# Patient Record
Sex: FEMALE | Race: BLACK OR AFRICAN AMERICAN | NOT HISPANIC OR LATINO | Employment: FULL TIME | URBAN - METROPOLITAN AREA
[De-identification: names, ages, dates, MRNs, and addresses within clinical notes are randomized per-mention and may not be internally consistent; named-entity substitution may affect disease eponyms.]

---

## 2019-01-03 LAB
HUMAN PAPILLOMAVIRUS (HPV): NORMAL
HUMAN PAPILLOMAVIRUS (HPV): NORMAL
PAP RECOMMENDATION EXT: NORMAL

## 2019-03-04 ENCOUNTER — ROUTINE PRENATAL (OUTPATIENT)
Dept: OBSTETRICS AND GYNECOLOGY | Facility: CLINIC | Age: 34
End: 2019-03-04
Payer: MEDICAID

## 2019-03-04 VITALS — HEART RATE: 102 BPM | SYSTOLIC BLOOD PRESSURE: 95 MMHG | WEIGHT: 277 LBS | DIASTOLIC BLOOD PRESSURE: 64 MMHG

## 2019-03-04 DIAGNOSIS — O09.522 ELDERLY MULTIGRAVIDA IN SECOND TRIMESTER: ICD-10-CM

## 2019-03-04 DIAGNOSIS — Z34.82 ENCOUNTER FOR SUPERVISION OF NORMAL PREGNANCY IN MULTIGRAVIDA IN SECOND TRIMESTER: Primary | ICD-10-CM

## 2019-03-04 PROCEDURE — 99213 PR OFFICE/OUTPT VISIT, EST, LEVL III, 20-29 MIN: ICD-10-PCS | Mod: TH,S$GLB,, | Performed by: OBSTETRICS & GYNECOLOGY

## 2019-03-04 PROCEDURE — 99213 OFFICE O/P EST LOW 20 MIN: CPT | Mod: TH,S$GLB,, | Performed by: OBSTETRICS & GYNECOLOGY

## 2019-03-04 NOTE — PROGRESS NOTES
Subjective:       Patient ID: Heather Mckeon is a 33 y.o.  at Unknown      Chief Complaint:  Routine Prenatal Visit      History of Present Illness  No complaints. Reports normal sx. Labs and history reviewed with pt.     cfDNA nml  Review of Systems  Denies n/v, f/c, dysuria, contractions,   VD, VB, round ligament pain, headaches       Objective:     Vitals:    19 1343   BP: 95/64   Pulse: 102     Wt Readings from Last 3 Encounters:   19 125.6 kg (277 lb)        nad  NCAT  pupils normal size  Skin nml no rashes or lesions  No resp distress, resp even and unlabored  Gravid nt, no rebound no guarding  No cyanosis or clubbing, edema appropriate for pregn    FHT: 140's       Assessment:        1. Encounter for supervision of normal pregnancy in multigravida in second trimester    2. Elderly multigravida in second trimester                Plan:         Prenatal testing - nml  Encouraged PNV  Pain, fever, bleeding precautions   RTC 4 weeks

## 2019-04-01 ENCOUNTER — ROUTINE PRENATAL (OUTPATIENT)
Dept: OBSTETRICS AND GYNECOLOGY | Facility: CLINIC | Age: 34
End: 2019-04-01
Payer: MEDICAID

## 2019-04-01 VITALS — WEIGHT: 282 LBS | HEART RATE: 103 BPM | DIASTOLIC BLOOD PRESSURE: 80 MMHG | SYSTOLIC BLOOD PRESSURE: 117 MMHG

## 2019-04-01 DIAGNOSIS — Z34.82 ENCOUNTER FOR SUPERVISION OF NORMAL PREGNANCY IN MULTIGRAVIDA IN SECOND TRIMESTER: Primary | ICD-10-CM

## 2019-04-01 DIAGNOSIS — O09.522 ELDERLY MULTIGRAVIDA IN SECOND TRIMESTER: ICD-10-CM

## 2019-04-01 DIAGNOSIS — E66.9 OBESITY, UNSPECIFIED CLASSIFICATION, UNSPECIFIED OBESITY TYPE, UNSPECIFIED WHETHER SERIOUS COMORBIDITY PRESENT: ICD-10-CM

## 2019-04-01 PROCEDURE — 99213 OFFICE O/P EST LOW 20 MIN: CPT | Mod: TH,S$GLB,, | Performed by: OBSTETRICS & GYNECOLOGY

## 2019-04-01 PROCEDURE — 99213 PR OFFICE/OUTPT VISIT, EST, LEVL III, 20-29 MIN: ICD-10-PCS | Mod: TH,S$GLB,, | Performed by: OBSTETRICS & GYNECOLOGY

## 2019-04-01 NOTE — PROGRESS NOTES
Subjective:       Patient ID: Heather Mckeon is a 33 y.o.  at 17w6d      Chief Complaint:  Routine Prenatal Visit      History of Present Illness  No complaints. Reports normal sx Labs and history reviewed with pt.     Talked about weight gain.  Pt not compliant.  Drinking close to 12 dr peppers a day      Review of Systems  Denies n/v, f/c, dysuria, contractions,   VD, VB, round ligament pain, headaches       Objective:     Vitals:    19 1331   BP: 117/80   Pulse: 103     Wt Readings from Last 3 Encounters:   19 127.9 kg (282 lb)   19 125.6 kg (277 lb)        nad  NCAT  pupils normal size  Skin nml no rashes or lesions  No resp distress, resp even and unlabored  Gravid nt, no rebound no guarding  No cyanosis or clubbing, edema appropriate for pregn    FHT: 140's       Assessment:        1. Encounter for supervision of normal pregnancy in multigravida in second trimester    2. Elderly multigravida in second trimester    3. Obesity, unspecified classification, unspecified obesity type, unspecified whether serious comorbidity present                Plan:      MFM consult already done  Encouraged PNV  Pain, fever, bleeding precautions   RTC 4 weeks      OS  Nutrition consult

## 2019-04-16 DIAGNOSIS — O09.522 AMA (ADVANCED MATERNAL AGE) MULTIGRAVIDA 35+, SECOND TRIMESTER: Primary | ICD-10-CM

## 2019-04-22 ENCOUNTER — OFFICE VISIT (OUTPATIENT)
Dept: MATERNAL FETAL MEDICINE | Facility: CLINIC | Age: 34
End: 2019-04-22
Payer: MEDICAID

## 2019-04-22 VITALS
HEART RATE: 86 BPM | RESPIRATION RATE: 20 BRPM | HEIGHT: 61 IN | DIASTOLIC BLOOD PRESSURE: 80 MMHG | BODY MASS INDEX: 53.05 KG/M2 | WEIGHT: 281 LBS | SYSTOLIC BLOOD PRESSURE: 124 MMHG

## 2019-04-22 DIAGNOSIS — O09.522 AMA (ADVANCED MATERNAL AGE) MULTIGRAVIDA 35+, SECOND TRIMESTER: ICD-10-CM

## 2019-04-22 LAB — GLUCOSE 1 HR POST 50 GM: 127 MG/DL

## 2019-04-22 PROCEDURE — 99203 PR OFFICE/OUTPT VISIT, NEW, LEVL III, 30-44 MIN: ICD-10-PCS | Mod: TH,25,S$GLB, | Performed by: OBSTETRICS & GYNECOLOGY

## 2019-04-22 PROCEDURE — 76811 PR US, OB FETAL EVAL & EXAM, TRANSABDOM,FIRST GESTATION: ICD-10-PCS | Mod: S$GLB,,, | Performed by: OBSTETRICS & GYNECOLOGY

## 2019-04-22 PROCEDURE — 99203 OFFICE O/P NEW LOW 30 MIN: CPT | Mod: TH,25,S$GLB, | Performed by: OBSTETRICS & GYNECOLOGY

## 2019-04-22 PROCEDURE — 76811 OB US DETAILED SNGL FETUS: CPT | Mod: S$GLB,,, | Performed by: OBSTETRICS & GYNECOLOGY

## 2019-04-22 NOTE — PROGRESS NOTES
Maternal Fetal Medicine consultation    April 22, 2019    Dear Dr. Baer,    Today, I had the opportunity to see your patient Ms. Heather Mckeon.  Our group greatly appreciate your referral for both ultrasound imaging and consultation.  Here patient is a 33-year-old multigravida at 21 weeks gestation.  The reason for consultation is advanced maternal age.    Past medical history:  Your patient denies major medical illnesses.  Specifically, she denies hypertension, diabetes, cardiac disease, renal disease, autoimmune disease, and spontaneous thrombosis.  She does have class 3 obesity.    Obstetric history:  Your patient has had 3 prior term deliveries.  She has had 2 miscarriages.  Her vaginal births were uncomplicated according to her history.    Family history:  Noncontributory.  There is no history of congenital cardiac disease, neural tube defect, Down syndrome, chromosomal abnormality, or single gene disorders.    Social history:  Your patient denies tobacco use, alcohol use, and recreational drug use.    Physical examination:  Vital signs stable.  The patient is a well-developed well-nourished female with class 3 obesity.  Her eye years node exam was grossly normal.  There was no facial edema. The abdominal exam was benign.  The uterus was nontender the uterus is appropriate size.  The extremity exam was normal without any evidence of significant edema. The neurologic exam was grossly intact.    Ultrasound findings:  A full ultrasound study was performed today.  A complete Maternal-Fetal Medicine ultrasound report has been created.  It will be sent separately.    Impression:  Your patient has advanced maternal age. She has a negative cell free DNA test in conjunction with a normal ultrasound assessment today.  Ultrasound imaging was limited by maternal size.  The cardiac outflow tracts, facial views, and aortic arch were not visualized adequately.  Therefore, a followup visit was suggested.  However, the patient  was informed that I feel the probability of defect is low based upon today's imaging.  The patient expressed understanding.    Recommendations:  With your permission, we will see the patient back in 5 weeks.  At that time hopefully get a better assessment of the cardiac and facial structures.  If all anatomy is thought to be normal that would likely be able to  forego care for the remainder of the pregnancy. Please give me a call if you have any question about the care of your patient.    Sincerely, Sourav

## 2019-04-22 NOTE — PROGRESS NOTES
Heather is here for initial MFM consultation, referred by Dr. Baer for AMA with low risk NIPT    She is feeling fetal movement.    Heather denies vaginal bleeding, loss of fluid, recurrent contractions.    Vitals:    04/22/19 0847   BP: 124/80   Pulse: 86   Resp: 20

## 2019-04-29 ENCOUNTER — ROUTINE PRENATAL (OUTPATIENT)
Dept: OBSTETRICS AND GYNECOLOGY | Facility: CLINIC | Age: 34
End: 2019-04-29
Payer: MEDICAID

## 2019-04-29 VITALS
SYSTOLIC BLOOD PRESSURE: 112 MMHG | WEIGHT: 283 LBS | HEART RATE: 100 BPM | DIASTOLIC BLOOD PRESSURE: 72 MMHG | BODY MASS INDEX: 53.47 KG/M2

## 2019-04-29 DIAGNOSIS — Z34.82 ENCOUNTER FOR SUPERVISION OF NORMAL PREGNANCY IN MULTIGRAVIDA IN SECOND TRIMESTER: Primary | ICD-10-CM

## 2019-04-29 DIAGNOSIS — E66.9 OBESITY, UNSPECIFIED CLASSIFICATION, UNSPECIFIED OBESITY TYPE, UNSPECIFIED WHETHER SERIOUS COMORBIDITY PRESENT: ICD-10-CM

## 2019-04-29 PROCEDURE — 99213 PR OFFICE/OUTPT VISIT, EST, LEVL III, 20-29 MIN: ICD-10-PCS | Mod: TH,S$GLB,, | Performed by: OBSTETRICS & GYNECOLOGY

## 2019-04-29 PROCEDURE — 99213 OFFICE O/P EST LOW 20 MIN: CPT | Mod: TH,S$GLB,, | Performed by: OBSTETRICS & GYNECOLOGY

## 2019-04-29 NOTE — PROGRESS NOTES
Subjective:       Patient ID: Heather Mckeon is a 33 y.o.  at 21w6d      Chief Complaint:  Routine Prenatal Visit      History of Present Illness  Complaints of wheeze that sounds like a whistle.   Labs and history reviewed with pt.     Saw mfm- nml scan    Review of Systems  Denies n/v, f/c, dysuria, contractions,   VD, VB, round ligament pain, headaches       Objective:   There were no vitals filed for this visit.  Wt Readings from Last 3 Encounters:   19 127.5 kg (281 lb)   19 127.9 kg (282 lb)   19 125.6 kg (277 lb)        nad  NCAT  pupils normal size  Skin nml no rashes or lesions  No resp distress, resp even and unlabored  Gravid nt, no rebound no guarding  No cyanosis or clubbing, edema appropriate for pregn  Lungs CTA BL  FHT: 140's       Assessment:      No diagnosis found.            Plan:           Encouraged PNV  Pain, fever, bleeding precautions   RTC 3 weeks

## 2019-05-20 ENCOUNTER — ROUTINE PRENATAL (OUTPATIENT)
Dept: OBSTETRICS AND GYNECOLOGY | Facility: CLINIC | Age: 34
End: 2019-05-20
Payer: MEDICAID

## 2019-05-20 VITALS
SYSTOLIC BLOOD PRESSURE: 122 MMHG | HEART RATE: 98 BPM | DIASTOLIC BLOOD PRESSURE: 74 MMHG | WEIGHT: 283 LBS | BODY MASS INDEX: 53.47 KG/M2

## 2019-05-20 DIAGNOSIS — Z34.82 ENCOUNTER FOR SUPERVISION OF NORMAL PREGNANCY IN MULTIGRAVIDA IN SECOND TRIMESTER: Primary | ICD-10-CM

## 2019-05-20 DIAGNOSIS — E66.9 OBESITY, UNSPECIFIED CLASSIFICATION, UNSPECIFIED OBESITY TYPE, UNSPECIFIED WHETHER SERIOUS COMORBIDITY PRESENT: ICD-10-CM

## 2019-05-20 PROCEDURE — 99213 OFFICE O/P EST LOW 20 MIN: CPT | Mod: TH,S$GLB,, | Performed by: OBSTETRICS & GYNECOLOGY

## 2019-05-20 PROCEDURE — 99213 PR OFFICE/OUTPT VISIT, EST, LEVL III, 20-29 MIN: ICD-10-PCS | Mod: TH,S$GLB,, | Performed by: OBSTETRICS & GYNECOLOGY

## 2019-05-20 NOTE — PROGRESS NOTES
Subjective:       Patient ID: Heather Mckeon is a 33 y.o.  at 24w6d     Chief Complaint:  Routine Prenatal Visit      History of Present Illness  No complaints. Reports normal sx. Labs and history reviewed with pt.         Review of Systems  Denies n/v, f/c, dysuria, contractions,   VD, VB, round ligament pain, headaches, preE ROS       Objective:     Vitals:    19 1357   BP: 122/74   Pulse: 98     Wt Readings from Last 3 Encounters:   19 128.4 kg (283 lb)   19 128.4 kg (283 lb)   19 127.5 kg (281 lb)       nad  NCAT  pupils normal size  Skin nml no rashes or lesions  No resp distress, resp even and unlabored  Gravid nt, no rebound no guarding  No cyanosis or clubbing, edema appropriate for pregn    FHT:140's      Assessment:      No diagnosis found.            Plan:        Encouraged PNV  Pain, fever, bleeding precautions   RTC 3 weeks   Repeat OS at next visit

## 2019-06-04 DIAGNOSIS — O09.522 AMA (ADVANCED MATERNAL AGE) MULTIGRAVIDA 35+, SECOND TRIMESTER: Primary | ICD-10-CM

## 2019-06-06 ENCOUNTER — INITIAL CONSULT (OUTPATIENT)
Dept: MATERNAL FETAL MEDICINE | Facility: CLINIC | Age: 34
End: 2019-06-06
Payer: MEDICAID

## 2019-06-06 VITALS
BODY MASS INDEX: 54.37 KG/M2 | DIASTOLIC BLOOD PRESSURE: 72 MMHG | SYSTOLIC BLOOD PRESSURE: 126 MMHG | WEIGHT: 288 LBS | RESPIRATION RATE: 22 BRPM | HEIGHT: 61 IN | HEART RATE: 98 BPM

## 2019-06-06 DIAGNOSIS — O09.522 AMA (ADVANCED MATERNAL AGE) MULTIGRAVIDA 35+, SECOND TRIMESTER: ICD-10-CM

## 2019-06-06 PROCEDURE — 99213 OFFICE O/P EST LOW 20 MIN: CPT | Mod: TH,25,S$GLB, | Performed by: OBSTETRICS & GYNECOLOGY

## 2019-06-06 PROCEDURE — 76816 PR  US,PREGNANT UTERUS,F/U,TRANSABD APP: ICD-10-PCS | Mod: S$GLB,,, | Performed by: OBSTETRICS & GYNECOLOGY

## 2019-06-06 PROCEDURE — 99213 PR OFFICE/OUTPT VISIT, EST, LEVL III, 20-29 MIN: ICD-10-PCS | Mod: TH,25,S$GLB, | Performed by: OBSTETRICS & GYNECOLOGY

## 2019-06-06 PROCEDURE — 76816 OB US FOLLOW-UP PER FETUS: CPT | Mod: S$GLB,,, | Performed by: OBSTETRICS & GYNECOLOGY

## 2019-06-06 RX ORDER — IBUPROFEN 600 MG/1
600 TABLET ORAL 3 TIMES DAILY PRN
COMMUNITY
End: 2019-06-11

## 2019-06-06 NOTE — PROGRESS NOTES
Heather is here for followup [facility] consultation for AMA with low risk NIPT result, referred by Dr. Baer.    She is feeling fetal movement.    Heather denies vaginal bleeding, loss of fluid, recurrent contractions.    She is complaining of persistent tooth pain and has been taking Ibuprofen 600mg 2-3 times a day for the past 2 weeks; can't get in to a dentist until July.  Heather was instructed to stop taking Ibuprofen immediately due to pregnancy, states last dose was at 1000 today.    Vitals:    06/06/19 1021   BP: 126/72   Pulse: 98   Resp: (!) 22

## 2019-06-06 NOTE — PROGRESS NOTES
Indication for follow up consultation:  1. AMA  2. Obesity    Provider requesting consultation: Emery Baer MD    Dear Emery,    I had the pleasure of seeing Heather Mckeon for follow up consultation today.  As you recall she is a 33 y.o.  at 27w2d here for follow up consultation regarding her advanced maternal age.  As you know she underwent NIPT and that returned as low risk.  Initial anatomical findings are reassuring.  The patient has no obstetric complaints but is complaining in of a severe toothache and possible infection has been going on [employer] for some time now and has gotten bad over the past 2 weeks.  Because of her Medicaid she has had trouble in and went in with a dentist but is still trying to..  She reports that the tooth is cracked and half.  She has been taking 2-3 ibuprofen tablets daily for the past 2 weeks to help control the pain and swelling. She reports normal fetal movement.    Review of systems: The patient denies any vaginal bleeding, loss of fluid or contraction pain today.  Vitals:    19 1021   BP: 126/72   Pulse: 98   Resp: (!) 22   Weight:  288    Physical exam:  Gen: WDWN in NAD  HEENT:  In the patient's upper left maxilla 1 of the Tese just in front of the 1st molar appears to be cracked and half.  I do not see any evidence of overt infection and definitely no evidence of abscess formation at this time.  Abdomen: Soft, non-tender  Skin: No rash or jaundice  Extremities: No clubbing or cyanosis  Neuro: Grossly intact    Ultrasound:  A repeat detailed fetal anatomical survey was completed once again today.  There is a single intrauterine pregnancy in the cephalic presentation.  The estimated fetal weight is 2 lb 12 oz or 1243 grams which is the 62nd percentile for this gestational age. The fetus did not show any overt evidence of structure abnormalities.  The amniotic fluid volume appears to be normal. The placenta does not show any evidence of previa.  Please see our  official report for further specifics.    Recommendations:   1.  Emery Ms. Mckeon is primary complaint was a severe to think that she has been dealing with for 2 weeks now.  I examined it appears to be a cracked tooth was no overt evidence of infection or abscess formation.  Apparently it may be as much as a month before she can see a dentist so went ahead and wrote her a prescription for amoxicillin 500 mg twice daily times 10 days primarily for infection prevention.  Again at this time I see no evidence of abscess formation but I reviewed the symptoms with her and told her to report to the emergency room showed that began to occur.  2.  I have no concerns about her obstetric status.  Findings are reassuring today and growth is on target.  3.  There is no reason to alter time in her mode of delivery.    A greatly appreciate referral please call if questions or concerns

## 2019-06-11 ENCOUNTER — ROUTINE PRENATAL (OUTPATIENT)
Dept: OBSTETRICS AND GYNECOLOGY | Facility: CLINIC | Age: 34
End: 2019-06-11
Payer: MEDICAID

## 2019-06-11 VITALS
HEART RATE: 102 BPM | WEIGHT: 285 LBS | BODY MASS INDEX: 53.85 KG/M2 | DIASTOLIC BLOOD PRESSURE: 77 MMHG | SYSTOLIC BLOOD PRESSURE: 127 MMHG

## 2019-06-11 DIAGNOSIS — O09.522 AMA (ADVANCED MATERNAL AGE) MULTIGRAVIDA 35+, SECOND TRIMESTER: Primary | ICD-10-CM

## 2019-06-11 DIAGNOSIS — E66.9 OBESITY, UNSPECIFIED CLASSIFICATION, UNSPECIFIED OBESITY TYPE, UNSPECIFIED WHETHER SERIOUS COMORBIDITY PRESENT: ICD-10-CM

## 2019-06-11 DIAGNOSIS — Z34.83 ENCOUNTER FOR SUPERVISION OF NORMAL PREGNANCY IN MULTIGRAVIDA IN THIRD TRIMESTER: ICD-10-CM

## 2019-06-11 PROCEDURE — 99213 OFFICE O/P EST LOW 20 MIN: CPT | Mod: TH,S$GLB,, | Performed by: OBSTETRICS & GYNECOLOGY

## 2019-06-11 PROCEDURE — 99213 PR OFFICE/OUTPT VISIT, EST, LEVL III, 20-29 MIN: ICD-10-PCS | Mod: TH,S$GLB,, | Performed by: OBSTETRICS & GYNECOLOGY

## 2019-06-11 NOTE — PROGRESS NOTES
Subjective:       Patient ID: Heather Mckeon is a 33 y.o.  at 28w0d     Chief Complaint:  Routine Prenatal Visit      History of Present Illness  No complaints. Reports normal sx. Labs and history reviewed with pt.         Review of Systems  Denies n/v, f/c, dysuria, contractions,   VD, VB, round ligament pain, headaches, preE ROS       Objective:     Vitals:    19 1409   BP: 127/77   Pulse: 102     Wt Readings from Last 3 Encounters:   19 129.3 kg (285 lb)   19 130.6 kg (288 lb)   19 128.4 kg (283 lb)       nad  NCAT  pupils normal size  Skin nml no rashes or lesions  No resp distress, resp even and unlabored  Gravid nt, no rebound no guarding  No cyanosis or clubbing, edema appropriate for pregn    FHT: 140's      Assessment:        1. AMA (advanced maternal age) multigravida 35+, second trimester    2. Encounter for supervision of normal pregnancy in multigravida in third trimester    3. Obesity, unspecified classification, unspecified obesity type, unspecified whether serious comorbidity present                Plan:      Repeat OS  Encouraged PNV  Pain, fever, bleeding precautions   RTC 1 weeks

## 2019-07-02 ENCOUNTER — ROUTINE PRENATAL (OUTPATIENT)
Dept: OBSTETRICS AND GYNECOLOGY | Facility: CLINIC | Age: 34
End: 2019-07-02
Payer: MEDICAID

## 2019-07-02 VITALS — BODY MASS INDEX: 53.09 KG/M2 | WEIGHT: 281 LBS

## 2019-07-02 DIAGNOSIS — E66.9 OBESITY, UNSPECIFIED CLASSIFICATION, UNSPECIFIED OBESITY TYPE, UNSPECIFIED WHETHER SERIOUS COMORBIDITY PRESENT: ICD-10-CM

## 2019-07-02 DIAGNOSIS — O09.522 AMA (ADVANCED MATERNAL AGE) MULTIGRAVIDA 35+, SECOND TRIMESTER: ICD-10-CM

## 2019-07-02 DIAGNOSIS — Z34.83 ENCOUNTER FOR SUPERVISION OF NORMAL PREGNANCY IN MULTIGRAVIDA IN THIRD TRIMESTER: ICD-10-CM

## 2019-07-02 DIAGNOSIS — Z34.83 NORMAL PREGNANCY IN MULTIGRAVIDA IN THIRD TRIMESTER: Primary | ICD-10-CM

## 2019-07-02 LAB — GLUCOSE 1 HR POST 50 GM: 118 MG/DL

## 2019-07-02 PROCEDURE — 90715 TDAP VACCINE GREATER THAN OR EQUAL TO 7YO IM: ICD-10-PCS | Mod: S$GLB,,, | Performed by: OBSTETRICS & GYNECOLOGY

## 2019-07-02 PROCEDURE — 90471 IMMUNIZATION ADMIN: CPT | Mod: S$GLB,,, | Performed by: OBSTETRICS & GYNECOLOGY

## 2019-07-02 PROCEDURE — 90715 TDAP VACCINE 7 YRS/> IM: CPT | Mod: S$GLB,,, | Performed by: OBSTETRICS & GYNECOLOGY

## 2019-07-02 PROCEDURE — 90471 TDAP VACCINE GREATER THAN OR EQUAL TO 7YO IM: ICD-10-PCS | Mod: S$GLB,,, | Performed by: OBSTETRICS & GYNECOLOGY

## 2019-07-02 PROCEDURE — 99213 OFFICE O/P EST LOW 20 MIN: CPT | Mod: 25,TH,S$GLB, | Performed by: OBSTETRICS & GYNECOLOGY

## 2019-07-02 PROCEDURE — 99213 PR OFFICE/OUTPT VISIT, EST, LEVL III, 20-29 MIN: ICD-10-PCS | Mod: 25,TH,S$GLB, | Performed by: OBSTETRICS & GYNECOLOGY

## 2019-07-02 NOTE — PROGRESS NOTES
Subjective:       Patient ID: Heather Mckeon is a 34 y.o.  at 31w0d     Chief Complaint:  Routine Prenatal Visit      History of Present Illness  No complaints. Reports normal sx. Labs and history reviewed with pt.         Review of Systems  Denies n/v, f/c, dysuria, contractions,   VD, VB, round ligament pain, headaches, preE ROS       Objective:   There were no vitals filed for this visit.  Wt Readings from Last 3 Encounters:   19 127.5 kg (281 lb)   19 129.3 kg (285 lb)   19 130.6 kg (288 lb)       nad  NCAT  pupils normal size  Skin nml no rashes or lesions  No resp distress, resp even and unlabored  Gravid nt, no rebound no guarding  No cyanosis or clubbing, edema appropriate for pregn    FHT: 140's      Assessment:        1. Normal pregnancy in multigravida in third trimester    2. AMA (advanced maternal age) multigravida 35+, second trimester    3. Encounter for supervision of normal pregnancy in multigravida in third trimester    4. Obesity, unspecified classification, unspecified obesity type, unspecified whether serious comorbidity present                Plan:         Encouraged PNV  Pain, fever, bleeding precautions   RTC 1 weeks for US  rtc 2 wks  Repeated OS today  H/h  tdap

## 2019-07-08 DIAGNOSIS — Z3A.32 32 WEEKS GESTATION OF PREGNANCY: Primary | ICD-10-CM

## 2019-07-09 ENCOUNTER — PROCEDURE VISIT (OUTPATIENT)
Dept: OBSTETRICS AND GYNECOLOGY | Facility: CLINIC | Age: 34
End: 2019-07-09
Payer: MEDICAID

## 2019-07-09 DIAGNOSIS — Z3A.32 32 WEEKS GESTATION OF PREGNANCY: ICD-10-CM

## 2019-07-09 LAB
HCT VFR BLD AUTO: 32.1 % (ref 37–47)
HGB BLD-MCNC: 9.5 G/DL (ref 12–16)

## 2019-07-09 PROCEDURE — 76815 OB US LIMITED FETUS(S): CPT | Mod: S$GLB,,, | Performed by: OBSTETRICS & GYNECOLOGY

## 2019-07-09 PROCEDURE — 76815 PR  US,PREGNANT UTERUS,LIMITED, 1/> FETUSES: ICD-10-PCS | Mod: S$GLB,,, | Performed by: OBSTETRICS & GYNECOLOGY

## 2019-07-15 ENCOUNTER — ROUTINE PRENATAL (OUTPATIENT)
Dept: OBSTETRICS AND GYNECOLOGY | Facility: CLINIC | Age: 34
End: 2019-07-15
Payer: MEDICAID

## 2019-07-15 VITALS
BODY MASS INDEX: 52.11 KG/M2 | WEIGHT: 275.81 LBS | DIASTOLIC BLOOD PRESSURE: 78 MMHG | SYSTOLIC BLOOD PRESSURE: 127 MMHG | HEART RATE: 99 BPM

## 2019-07-15 DIAGNOSIS — Z34.83 ENCOUNTER FOR SUPERVISION OF NORMAL PREGNANCY IN MULTIGRAVIDA IN THIRD TRIMESTER: Primary | ICD-10-CM

## 2019-07-15 DIAGNOSIS — O09.522 AMA (ADVANCED MATERNAL AGE) MULTIGRAVIDA 35+, SECOND TRIMESTER: ICD-10-CM

## 2019-07-15 DIAGNOSIS — E66.9 OBESITY, UNSPECIFIED CLASSIFICATION, UNSPECIFIED OBESITY TYPE, UNSPECIFIED WHETHER SERIOUS COMORBIDITY PRESENT: ICD-10-CM

## 2019-07-15 PROCEDURE — 99213 OFFICE O/P EST LOW 20 MIN: CPT | Mod: TH,S$GLB,, | Performed by: OBSTETRICS & GYNECOLOGY

## 2019-07-15 PROCEDURE — 99213 PR OFFICE/OUTPT VISIT, EST, LEVL III, 20-29 MIN: ICD-10-PCS | Mod: TH,S$GLB,, | Performed by: OBSTETRICS & GYNECOLOGY

## 2019-07-15 NOTE — PROGRESS NOTES
Subjective:       Patient ID: Heather Mckeon is a 34 y.o.  at 32w6d     Chief Complaint:  Routine Prenatal Visit      History of Present Illness  No complaints. Reports normal sx. Labs and history reviewed with pt.         Review of Systems  Denies n/v, f/c, dysuria, contractions,   VD, VB, round ligament pain, headaches, preE ROS       Objective:     Vitals:    07/15/19 1547   BP: 127/78   Pulse: 99     Wt Readings from Last 3 Encounters:   07/15/19 125.1 kg (275 lb 12.8 oz)   19 127.5 kg (281 lb)   19 129.3 kg (285 lb)       nad  NCAT  pupils normal size  Skin nml no rashes or lesions  No resp distress, resp even and unlabored  Gravid nt, no rebound no guarding  No cyanosis or clubbing, edema appropriate for pregn    FHT:140's      Assessment:        1. Encounter for supervision of normal pregnancy in multigravida in third trimester    2. AMA (advanced maternal age) multigravida 35+, second trimester    3. Obesity, unspecified classification, unspecified obesity type, unspecified whether serious comorbidity present                Plan:       taking iron  Encouraged PNV  Pain, fever, bleeding precautions   RTC 2.5 weeks

## 2019-08-06 ENCOUNTER — ROUTINE PRENATAL (OUTPATIENT)
Dept: OBSTETRICS AND GYNECOLOGY | Facility: CLINIC | Age: 34
End: 2019-08-06
Payer: MEDICAID

## 2019-08-06 VITALS
BODY MASS INDEX: 52.34 KG/M2 | SYSTOLIC BLOOD PRESSURE: 109 MMHG | WEIGHT: 277 LBS | DIASTOLIC BLOOD PRESSURE: 74 MMHG | HEART RATE: 114 BPM

## 2019-08-06 DIAGNOSIS — Z34.83 ENCOUNTER FOR SUPERVISION OF NORMAL PREGNANCY IN MULTIGRAVIDA IN THIRD TRIMESTER: Primary | ICD-10-CM

## 2019-08-06 DIAGNOSIS — E66.9 OBESITY, UNSPECIFIED CLASSIFICATION, UNSPECIFIED OBESITY TYPE, UNSPECIFIED WHETHER SERIOUS COMORBIDITY PRESENT: ICD-10-CM

## 2019-08-06 DIAGNOSIS — O09.522 AMA (ADVANCED MATERNAL AGE) MULTIGRAVIDA 35+, SECOND TRIMESTER: ICD-10-CM

## 2019-08-06 PROCEDURE — 99213 PR OFFICE/OUTPT VISIT, EST, LEVL III, 20-29 MIN: ICD-10-PCS | Mod: TH,S$GLB,, | Performed by: OBSTETRICS & GYNECOLOGY

## 2019-08-06 PROCEDURE — 99213 OFFICE O/P EST LOW 20 MIN: CPT | Mod: TH,S$GLB,, | Performed by: OBSTETRICS & GYNECOLOGY

## 2019-08-06 NOTE — PROGRESS NOTES
Subjective:       Patient ID: Heather Mckeon is a 34 y.o.  at 36w0d     Chief Complaint:  Routine Prenatal Visit      History of Present Illness  No complaints. Reports normal sx. Labs and history reviewed with pt.         Review of Systems  Denies n/v, f/c, dysuria, contractions,   VD, VB, round ligament pain, headaches, preE ROS       Objective:     Vitals:    19 1314   BP: 109/74   Pulse: (!) 114     Wt Readings from Last 3 Encounters:   19 125.6 kg (277 lb)   07/15/19 125.1 kg (275 lb 12.8 oz)   19 127.5 kg (281 lb)       nad  NCAT  pupils normal size  Skin nml no rashes or lesions  No resp distress, resp even and unlabored  Gravid nt, no rebound no guarding  No cyanosis or clubbing, edema appropriate for pregn    FHT: 140's  CVX: 1-2/25/h    Assessment:        1. Encounter for supervision of normal pregnancy in multigravida in third trimester    2. AMA (advanced maternal age) multigravida 35+, second trimester    3. Obesity, unspecified classification, unspecified obesity type, unspecified whether serious comorbidity present                Plan:         GBS  Encouraged PNV  Pain, fever, bleeding precautions   RTC 1 weeks

## 2019-08-07 LAB
GROUP B STREP MOLECULAR: NEGATIVE
PENICILLIN ALLERGIC: NORMAL

## 2019-08-13 ENCOUNTER — ROUTINE PRENATAL (OUTPATIENT)
Dept: OBSTETRICS AND GYNECOLOGY | Facility: CLINIC | Age: 34
End: 2019-08-13
Payer: MEDICAID

## 2019-08-13 VITALS
HEART RATE: 100 BPM | BODY MASS INDEX: 52.34 KG/M2 | SYSTOLIC BLOOD PRESSURE: 126 MMHG | WEIGHT: 277 LBS | DIASTOLIC BLOOD PRESSURE: 78 MMHG

## 2019-08-13 DIAGNOSIS — Z34.83 ENCOUNTER FOR SUPERVISION OF NORMAL PREGNANCY IN MULTIGRAVIDA IN THIRD TRIMESTER: Primary | ICD-10-CM

## 2019-08-13 DIAGNOSIS — E66.9 OBESITY, UNSPECIFIED CLASSIFICATION, UNSPECIFIED OBESITY TYPE, UNSPECIFIED WHETHER SERIOUS COMORBIDITY PRESENT: ICD-10-CM

## 2019-08-13 DIAGNOSIS — O09.522 AMA (ADVANCED MATERNAL AGE) MULTIGRAVIDA 35+, SECOND TRIMESTER: ICD-10-CM

## 2019-08-13 PROCEDURE — 99213 PR OFFICE/OUTPT VISIT, EST, LEVL III, 20-29 MIN: ICD-10-PCS | Mod: TH,S$GLB,, | Performed by: OBSTETRICS & GYNECOLOGY

## 2019-08-13 PROCEDURE — 99213 OFFICE O/P EST LOW 20 MIN: CPT | Mod: TH,S$GLB,, | Performed by: OBSTETRICS & GYNECOLOGY

## 2019-08-13 NOTE — PROGRESS NOTES
Subjective:       Patient ID: Heather Mckeon is a 34 y.o.  at 37w0d     Chief Complaint:  Routine Prenatal Visit      History of Present Illness  No complaints. Reports normal sx. Labs and history reviewed with pt.         Review of Systems  Denies n/v, f/c, dysuria, contractions,   VD, VB, round ligament pain, headaches, preE ROS       Objective:     Vitals:    19 0844   BP: 126/78   Pulse: 100     Wt Readings from Last 3 Encounters:   19 125.6 kg (277 lb)   19 125.6 kg (277 lb)   07/15/19 125.1 kg (275 lb 12.8 oz)       nad  NCAT  pupils normal size  Skin nml no rashes or lesions  No resp distress, resp even and unlabored  Gravid nt, no rebound no guarding  No cyanosis or clubbing, edema appropriate for pregn    FHT: 140's  CVX: 3/50/h    Assessment:        1. Encounter for supervision of normal pregnancy in multigravida in third trimester    2. AMA (advanced maternal age) multigravida 35+, second trimester    3. Obesity, unspecified classification, unspecified obesity type, unspecified whether serious comorbidity present                Plan:        GBS neg  Encouraged PNV  Pain, fever, bleeding precautions   RTC 1 weeks

## 2019-08-16 LAB
AMNISURE ROM QUALITATIVE: NORMAL
AMORPHOUS URINE: ABNORMAL /LPF
APPEARANCE, UA: CLEAR
BACTERIA SPEC CULT: ABNORMAL /HPF
BILIRUB UR QL STRIP: NEGATIVE MG/DL
COLOR UR: ABNORMAL
GLUCOSE (UA): NORMAL MG/DL
HGB UR QL STRIP: NEGATIVE /UL
KETONES UR QL STRIP: ABNORMAL MG/DL
LEUKOCYTE ESTERASE UR QL STRIP: 100 /UL
NITRITE UR QL STRIP: NEGATIVE
PH UR STRIP: 6 PH (ref 5–9)
PROT UR QL STRIP: ABNORMAL MG/DL
RBC #/AREA URNS HPF: ABNORMAL /HPF (ref 0–2)
SERVICE COMMENT 03: ABNORMAL
SP GR UR STRIP: 1.01 (ref 1–1.03)
SPECIMEN COLLECTION METHOD, URINE: ABNORMAL
SQUAMOUS EPITHELIAL, UA: ABNORMAL /LPF
UROBILINOGEN UR STRIP-ACNC: 4 MG/DL
WBC #/AREA URNS HPF: ABNORMAL /HPF (ref 0–5)

## 2019-08-20 ENCOUNTER — ROUTINE PRENATAL (OUTPATIENT)
Dept: OBSTETRICS AND GYNECOLOGY | Facility: CLINIC | Age: 34
End: 2019-08-20
Payer: MEDICAID

## 2019-08-20 VITALS
SYSTOLIC BLOOD PRESSURE: 105 MMHG | DIASTOLIC BLOOD PRESSURE: 73 MMHG | HEART RATE: 99 BPM | WEIGHT: 280 LBS | BODY MASS INDEX: 52.91 KG/M2

## 2019-08-20 DIAGNOSIS — O09.522 AMA (ADVANCED MATERNAL AGE) MULTIGRAVIDA 35+, SECOND TRIMESTER: ICD-10-CM

## 2019-08-20 DIAGNOSIS — Z34.83 ENCOUNTER FOR SUPERVISION OF NORMAL PREGNANCY IN MULTIGRAVIDA IN THIRD TRIMESTER: Primary | ICD-10-CM

## 2019-08-20 DIAGNOSIS — E66.9 OBESITY, UNSPECIFIED CLASSIFICATION, UNSPECIFIED OBESITY TYPE, UNSPECIFIED WHETHER SERIOUS COMORBIDITY PRESENT: ICD-10-CM

## 2019-08-20 PROCEDURE — 99213 PR OFFICE/OUTPT VISIT, EST, LEVL III, 20-29 MIN: ICD-10-PCS | Mod: TH,S$GLB,, | Performed by: OBSTETRICS & GYNECOLOGY

## 2019-08-20 PROCEDURE — 99213 OFFICE O/P EST LOW 20 MIN: CPT | Mod: TH,S$GLB,, | Performed by: OBSTETRICS & GYNECOLOGY

## 2019-08-25 ENCOUNTER — OUTSIDE PLACE OF SERVICE (OUTPATIENT)
Dept: OBSTETRICS AND GYNECOLOGY | Facility: CLINIC | Age: 34
End: 2019-08-25
Payer: MEDICAID

## 2019-08-25 LAB
ANISOCYTOSIS: NORMAL
BASOPHILS NFR SNV MANUAL: 0.3 % (ref 0–3)
EOSINOPHIL NFR SNV MANUAL: 3 % (ref 1–3)
ERYTHROCYTE [DISTWIDTH] IN BLOOD BY AUTOMATED COUNT: 17.6 % (ref 12.5–18)
HCT VFR BLD AUTO: 28.6 % (ref 37–47)
HGB BLD-MCNC: 8.7 G/DL (ref 12–16)
HYPOCHROMIA BLD QL SMEAR: NORMAL
LYMPHOCYTES NFR SNV MANUAL: 12.4 % (ref 25–40)
MANUAL NRBC PER 100 CELLS: 0.3 %
MCH RBC QN AUTO: 21.6 PG (ref 27–31.2)
MCHC RBC AUTO-ENTMCNC: 30.4 G/DL (ref 31.8–35.4)
MCV RBC AUTO: 71 FL (ref 80–97)
MICROCYTES BLD QL SMEAR: NORMAL
MONOCYTES/100 LEUKOCYTES: 6.2 % (ref 1–15)
NEUTROPHILS NFR BLD: 9.75 10*3/UL (ref 1.8–7.7)
NEUTROPHILS NFR SNV MANUAL: 77.2 % (ref 37–80)
PLATELETS: 321 10*3/UL (ref 142–424)
RBC # BLD AUTO: 4.03 10*6/UL (ref 4.2–5.4)
RPR: NON REACTIVE
WBC # BLD: 12.6 10*3/UL (ref 4.6–10.2)

## 2019-08-25 PROCEDURE — 59409 PR OBSTETRICAL CARE,VAG DELIV ONLY: ICD-10-PCS | Mod: AT,,, | Performed by: OBSTETRICS & GYNECOLOGY

## 2019-08-25 PROCEDURE — 59409 OBSTETRICAL CARE: CPT | Mod: AT,,, | Performed by: OBSTETRICS & GYNECOLOGY

## 2019-08-26 PROCEDURE — 99231 PR SUBSEQUENT HOSPITAL CARE,LEVL I: ICD-10-PCS | Mod: ,,, | Performed by: OBSTETRICS & GYNECOLOGY

## 2019-08-26 PROCEDURE — 99231 SBSQ HOSP IP/OBS SF/LOW 25: CPT | Mod: ,,, | Performed by: OBSTETRICS & GYNECOLOGY

## 2019-08-27 PROCEDURE — 99238 HOSP IP/OBS DSCHRG MGMT 30/<: CPT | Mod: ,,, | Performed by: OBSTETRICS & GYNECOLOGY

## 2019-08-27 PROCEDURE — 99238 PR HOSPITAL DISCHARGE DAY,<30 MIN: ICD-10-PCS | Mod: ,,, | Performed by: OBSTETRICS & GYNECOLOGY

## 2019-10-18 ENCOUNTER — POSTPARTUM VISIT (OUTPATIENT)
Dept: OBSTETRICS AND GYNECOLOGY | Facility: CLINIC | Age: 34
End: 2019-10-18
Payer: MEDICAID

## 2019-10-18 VITALS
WEIGHT: 256 LBS | SYSTOLIC BLOOD PRESSURE: 120 MMHG | BODY MASS INDEX: 48.37 KG/M2 | DIASTOLIC BLOOD PRESSURE: 78 MMHG | HEART RATE: 64 BPM

## 2019-10-18 DIAGNOSIS — Z76.89 ENCOUNTER TO ESTABLISH CARE: Primary | ICD-10-CM

## 2019-10-18 PROCEDURE — 0503F PR POSTPARTUM CARE VISIT: ICD-10-PCS | Mod: S$GLB,,, | Performed by: OBSTETRICS & GYNECOLOGY

## 2019-10-18 PROCEDURE — 0503F POSTPARTUM CARE VISIT: CPT | Mod: S$GLB,,, | Performed by: OBSTETRICS & GYNECOLOGY

## 2019-10-18 NOTE — PROGRESS NOTES
Subjective:       Patient ID: Heather Mckeon is a 34 y.o. female.    Chief Complaint:  Postpartum Care      History of Present Illness  Here for 6 wk pp exam sp   Complaints none    Review of Systems  Review of Systems   Constitutional: Negative for activity change, appetite change, chills, diaphoresis and fever.   Respiratory: Negative for shortness of breath.    Cardiovascular: Negative for chest pain.   Gastrointestinal: Negative for abdominal pain, bloating, constipation, nausea and vomiting.   Genitourinary: Negative for dysuria, flank pain, hematuria and menorrhagia.   Integumentary:  Negative for breast mass, breast skin changes and breast tenderness.   Neurological: Negative for headaches.   Psychiatric/Behavioral: Negative for depression. The patient is not nervous/anxious.    Breast: Negative for lump, mass, mastodynia, skin changes and tenderness          Objective:    Physical Exam:   Constitutional: She appears well-developed and well-nourished. No distress.    HENT:   Head: Normocephalic and atraumatic.    Eyes: Conjunctivae and EOM are normal.    Neck: Normal range of motion. No thyromegaly present.    Cardiovascular: Exam reveals no clubbing, no cyanosis and no edema.     Pulmonary/Chest: Effort normal. No respiratory distress.        Abdominal: Soft. She exhibits no distension. There is no tenderness.     Genitourinary: Vagina normal and uterus normal. Pelvic exam was performed with patient supine. There is no rash, tenderness, lesion or injury on the right labia. There is no rash, tenderness, lesion or injury on the left labia. Uterus is not enlarged, not tender and not hosting fibroids. Cervix is normal. Right adnexum displays no mass, no tenderness and no fullness. Left adnexum displays no mass, no tenderness and no fullness. No erythema or tenderness in the vagina. No foreign body in the vagina. No signs of injury around the vagina. No vaginal discharge found.                Skin: She is  not diaphoretic. No cyanosis. Nails show no clubbing.           Assessment:     Postpartum  Depression screen nml      Plan:   rtc for annual or prn  Contraception iud  Preventative screening utd

## 2019-10-28 ENCOUNTER — OFFICE VISIT (OUTPATIENT)
Dept: FAMILY MEDICINE | Facility: CLINIC | Age: 34
End: 2019-10-28
Payer: MEDICAID

## 2019-10-28 VITALS
HEART RATE: 79 BPM | OXYGEN SATURATION: 98 % | SYSTOLIC BLOOD PRESSURE: 120 MMHG | HEIGHT: 60 IN | DIASTOLIC BLOOD PRESSURE: 70 MMHG | WEIGHT: 254 LBS | BODY MASS INDEX: 49.87 KG/M2

## 2019-10-28 DIAGNOSIS — M79.672 PAIN IN BOTH FEET: ICD-10-CM

## 2019-10-28 DIAGNOSIS — Z00.00 PREVENTATIVE HEALTH CARE: Primary | ICD-10-CM

## 2019-10-28 DIAGNOSIS — R53.83 FATIGUE, UNSPECIFIED TYPE: ICD-10-CM

## 2019-10-28 DIAGNOSIS — M79.671 PAIN IN BOTH FEET: ICD-10-CM

## 2019-10-28 DIAGNOSIS — D50.9 IRON DEFICIENCY ANEMIA, UNSPECIFIED IRON DEFICIENCY ANEMIA TYPE: ICD-10-CM

## 2019-10-28 PROCEDURE — 99204 OFFICE O/P NEW MOD 45 MIN: CPT | Mod: S$GLB,,, | Performed by: FAMILY MEDICINE

## 2019-10-28 PROCEDURE — 99204 PR OFFICE/OUTPT VISIT, NEW, LEVL IV, 45-59 MIN: ICD-10-PCS | Mod: S$GLB,,, | Performed by: FAMILY MEDICINE

## 2019-10-28 RX ORDER — FERROUS SULFATE 325(65) MG
325 TABLET ORAL
Qty: 90 TABLET | Refills: 0 | COMMUNITY
Start: 2019-10-28

## 2019-10-28 NOTE — PROGRESS NOTES
"Subjective:       Patient ID: Heather Mckeon is a 34 y.o. female.    Chief Complaint: Establish Care (Pt states she has foot pain; she cannot get out of the bed and just start walking("not sure if its bc the blood flow has to get to my feet or what"))    HPI     Here to est care. Pt c/o fatigue. Also reports plantar foot pain and numbness when she wakes up in the am and improves after she walks around some. She delivered 2 months ago. hgb 8.7 at that time. Has not been on iron. She also reports about a 20 lb weight loss since delivery and plans to continue trying to lose weight.    Prev - utd on pap and vacs    Review of Systems   Constitutional: Positive for fatigue. Negative for chills and fever.   HENT: Negative for congestion, rhinorrhea and sore throat.    Eyes: Negative for visual disturbance.   Respiratory: Negative for cough, chest tightness, shortness of breath and wheezing.    Cardiovascular: Negative for chest pain, palpitations and leg swelling.   Gastrointestinal: Negative for abdominal pain, constipation, diarrhea, nausea and vomiting.   Endocrine: Negative for polydipsia and polyuria.   Genitourinary: Negative for decreased urine volume, dysuria and frequency.   Musculoskeletal: Negative for arthralgias, back pain and myalgias.   Skin: Negative for color change, pallor and rash.   Neurological: Positive for numbness. Negative for dizziness, syncope, weakness, light-headedness and headaches.   Psychiatric/Behavioral: Negative for dysphoric mood and sleep disturbance. The patient is not nervous/anxious.        Objective:      Physical Exam   Constitutional: She is oriented to person, place, and time. She appears well-developed and well-nourished. No distress.   HENT:   Head: Normocephalic and atraumatic.   Nose: Nose normal.   Mouth/Throat: Oropharynx is clear and moist.   Eyes: Conjunctivae are normal.   Neck: Normal range of motion. Neck supple. No JVD present. No thyromegaly present. "   Cardiovascular: Normal rate, regular rhythm, normal heart sounds and intact distal pulses. Exam reveals no gallop and no friction rub.   No murmur heard.  Pulmonary/Chest: Effort normal and breath sounds normal. No stridor. No respiratory distress. She has no wheezes. She has no rales.   Abdominal: Soft. Bowel sounds are normal. She exhibits no distension. There is no tenderness.   Musculoskeletal: She exhibits no edema or tenderness.   Lymphadenopathy:     She has no cervical adenopathy.   Neurological: She is alert and oriented to person, place, and time.   Skin: Skin is warm and dry. No rash noted. She is not diaphoretic. No erythema. No pallor.   Psychiatric: She has a normal mood and affect. Her behavior is normal.   Vitals reviewed.      Assessment:       1. Preventative health care    2. Iron deficiency anemia, unspecified iron deficiency anemia type    3. Fatigue, unspecified type    4. Pain in both feet    5. BMI 45.0-49.9, adult        Plan:       Heather was seen today for establish care.    Diagnoses and all orders for this visit:    Preventative health care  Comments:  utd on pap and vacs    Iron deficiency anemia, unspecified iron deficiency anemia type  -     CBC auto differential; Future  -     Iron, TIBC and Ferritin Panel; Future  -     CBC auto differential  -     Iron, TIBC and Ferritin Panel  -     ferrous sulfate (FEOSOL) 325 mg (65 mg iron) Tab tablet; Take 1 tablet (325 mg total) by mouth 3 (three) times daily with meals.    Fatigue, unspecified type  -     Comprehensive metabolic panel; Future  -     TSH; Future  -     Hemoglobin A1c; Future  -     T4, free; Future  -     Comprehensive metabolic panel  -     TSH  -     Hemoglobin A1c  -     T4, free  -     Urinalysis; Future  -     Urinalysis    Pain in both feet  Comments:  getting a1c    BMI 45.0-49.9, adult  Comments:  counseled on diet/exercise and weight loss plan/goal

## 2019-11-05 ENCOUNTER — PROCEDURE VISIT (OUTPATIENT)
Dept: OBSTETRICS AND GYNECOLOGY | Facility: CLINIC | Age: 34
End: 2019-11-05
Payer: MEDICAID

## 2019-11-05 VITALS
SYSTOLIC BLOOD PRESSURE: 121 MMHG | BODY MASS INDEX: 48.82 KG/M2 | WEIGHT: 250 LBS | DIASTOLIC BLOOD PRESSURE: 85 MMHG | HEART RATE: 75 BPM

## 2019-11-05 DIAGNOSIS — Z30.430 ENCOUNTER FOR IUD INSERTION: Primary | ICD-10-CM

## 2019-11-05 PROCEDURE — 58300 INSERT INTRAUTERINE DEVICE: CPT | Mod: S$GLB,,, | Performed by: OBSTETRICS & GYNECOLOGY

## 2019-11-05 PROCEDURE — 58300 INSERTION OF IUD: ICD-10-PCS | Mod: S$GLB,,, | Performed by: OBSTETRICS & GYNECOLOGY

## 2019-11-05 NOTE — PROCEDURES
Insertion of IUD  Date/Time: 11/5/2019 1:00 PM  Performed by: Emery Baer MD  Authorized by: Emery Baer MD     Consent:     Consent obtained:  Verbal    Consent given by:  Patient    Procedure risks and benefits discussed: yes      Patient questions answered: yes      Patient agrees, verbalizes understanding, and wants to proceed: yes      Instructions and paperwork completed: yes    Procedure:     Pelvic exam performed: yes      Negative urine pregnancy test: yes      Cervix cleaned and prepped: yes      Speculum placed in vagina: yes      Tenaculum applied to cervix: yes      Uterus sounded: yes      Uterus sound depth (cm):  7    IUD type:  Mirena    Strings trimmed: yes    Post-procedure:     Patient tolerated procedure well: yes      Patient will follow up after next period: yes

## 2019-12-18 ENCOUNTER — PROCEDURE VISIT (OUTPATIENT)
Dept: OBSTETRICS AND GYNECOLOGY | Facility: CLINIC | Age: 34
End: 2019-12-18
Payer: MEDICAID

## 2019-12-18 DIAGNOSIS — Z97.5 IUD (INTRAUTERINE DEVICE) IN PLACE: Primary | ICD-10-CM

## 2019-12-18 DIAGNOSIS — Z97.5 IUD (INTRAUTERINE DEVICE) IN PLACE: ICD-10-CM

## 2019-12-18 PROCEDURE — 76830 TRANSVAGINAL US NON-OB: CPT | Mod: S$GLB,,, | Performed by: OBSTETRICS & GYNECOLOGY

## 2019-12-18 PROCEDURE — 76830 PR  ECHOGRAPHY,TRANSVAGINAL: ICD-10-PCS | Mod: S$GLB,,, | Performed by: OBSTETRICS & GYNECOLOGY

## 2020-02-11 NOTE — PROGRESS NOTES
Subjective:       Patient ID: Heather Mckeon is a 34 y.o.  at 38w0d     Chief Complaint:  Routine Prenatal Visit      History of Present Illness  No complaints. Reports normal sx. Labs and history reviewed with pt.         Review of Systems  Denies n/v, f/c, dysuria, contractions,   VD, VB, round ligament pain, headaches, preE ROS       Objective:     Vitals:    19 0833   BP: 105/73   Pulse: 99     Wt Readings from Last 3 Encounters:   19 127 kg (280 lb)   19 125.6 kg (277 lb)   19 125.6 kg (277 lb)       nad  NCAT  pupils normal size  Skin nml no rashes or lesions  No resp distress, resp even and unlabored  Gravid nt, no rebound no guarding  No cyanosis or clubbing, edema appropriate for pregn    FHT: 140's  CVX: 2-3/70/h    Assessment:        1. Encounter for supervision of normal pregnancy in multigravida in third trimester    2. AMA (advanced maternal age) multigravida 35+, second trimester    3. Obesity, unspecified classification, unspecified obesity type, unspecified whether serious comorbidity present                Plan:        Encouraged PNV  Pain, fever, bleeding precautions   Induction 1 wk       
LEUKOCYTES:neg  NITRATES:neg  BLOOD:neg  KETONES:neg    
What Type Of Note Output Would You Prefer (Optional)?: Standard Output
How Severe Is Your Skin Lesion?: mild
Has Your Skin Lesion Been Treated?: not been treated
Is This A New Presentation, Or A Follow-Up?: Skin Lesion

## 2021-03-10 ENCOUNTER — OFFICE VISIT (OUTPATIENT)
Dept: FAMILY MEDICINE | Facility: CLINIC | Age: 36
End: 2021-03-10
Payer: MEDICAID

## 2021-03-10 VITALS
BODY MASS INDEX: 54.38 KG/M2 | HEIGHT: 60 IN | SYSTOLIC BLOOD PRESSURE: 126 MMHG | DIASTOLIC BLOOD PRESSURE: 61 MMHG | WEIGHT: 277 LBS | HEART RATE: 85 BPM | OXYGEN SATURATION: 100 %

## 2021-03-10 DIAGNOSIS — Z20.822 EXPOSURE TO COVID-19 VIRUS: ICD-10-CM

## 2021-03-10 DIAGNOSIS — Z00.00 PREVENTATIVE HEALTH CARE: Primary | ICD-10-CM

## 2021-03-10 DIAGNOSIS — J06.9 UPPER RESPIRATORY TRACT INFECTION, UNSPECIFIED TYPE: ICD-10-CM

## 2021-03-10 LAB
CTP QC/QA: YES
SARS-COV-2 RDRP RESP QL NAA+PROBE: NEGATIVE

## 2021-03-10 PROCEDURE — 87635: ICD-10-PCS | Mod: QW,S$GLB,, | Performed by: FAMILY MEDICINE

## 2021-03-10 PROCEDURE — 99214 OFFICE O/P EST MOD 30 MIN: CPT | Mod: S$GLB,,, | Performed by: FAMILY MEDICINE

## 2021-03-10 PROCEDURE — 99214 PR OFFICE/OUTPT VISIT, EST, LEVL IV, 30-39 MIN: ICD-10-PCS | Mod: S$GLB,,, | Performed by: FAMILY MEDICINE

## 2021-03-10 PROCEDURE — 87635 SARS-COV-2 COVID-19 AMP PRB: CPT | Mod: QW,S$GLB,, | Performed by: FAMILY MEDICINE

## 2021-03-10 RX ORDER — AZITHROMYCIN 250 MG/1
TABLET, FILM COATED ORAL
Qty: 6 TABLET | Refills: 0 | Status: SHIPPED | OUTPATIENT
Start: 2021-03-10

## 2021-03-10 RX ORDER — BETAMETHASONE SODIUM PHOSPHATE AND BETAMETHASONE ACETATE 3; 3 MG/ML; MG/ML
6 INJECTION, SUSPENSION INTRA-ARTICULAR; INTRALESIONAL; INTRAMUSCULAR; SOFT TISSUE ONCE
Status: COMPLETED | OUTPATIENT
Start: 2021-03-10 | End: 2021-03-10

## 2021-03-10 RX ADMIN — BETAMETHASONE SODIUM PHOSPHATE AND BETAMETHASONE ACETATE 6 MG: 3; 3 INJECTION, SUSPENSION INTRA-ARTICULAR; INTRALESIONAL; INTRAMUSCULAR; SOFT TISSUE at 04:03

## 2022-04-06 ENCOUNTER — OFFICE VISIT (OUTPATIENT)
Dept: FAMILY MEDICINE | Facility: CLINIC | Age: 37
End: 2022-04-06
Payer: MEDICAID

## 2022-04-06 VITALS
SYSTOLIC BLOOD PRESSURE: 119 MMHG | HEART RATE: 92 BPM | BODY MASS INDEX: 51.43 KG/M2 | DIASTOLIC BLOOD PRESSURE: 80 MMHG | OXYGEN SATURATION: 99 % | HEIGHT: 61 IN | WEIGHT: 272.38 LBS

## 2022-04-06 DIAGNOSIS — Z00.00 WELLNESS EXAMINATION: Primary | ICD-10-CM

## 2022-04-06 PROCEDURE — 1159F PR MEDICATION LIST DOCUMENTED IN MEDICAL RECORD: ICD-10-PCS | Mod: CPTII,S$GLB,, | Performed by: PHYSICIAN ASSISTANT

## 2022-04-06 PROCEDURE — 3079F PR MOST RECENT DIASTOLIC BLOOD PRESSURE 80-89 MM HG: ICD-10-PCS | Mod: CPTII,S$GLB,, | Performed by: PHYSICIAN ASSISTANT

## 2022-04-06 PROCEDURE — 3008F PR BODY MASS INDEX (BMI) DOCUMENTED: ICD-10-PCS | Mod: CPTII,S$GLB,, | Performed by: PHYSICIAN ASSISTANT

## 2022-04-06 PROCEDURE — 99395 PR PREVENTIVE VISIT,EST,18-39: ICD-10-PCS | Mod: S$GLB,,, | Performed by: PHYSICIAN ASSISTANT

## 2022-04-06 PROCEDURE — 1159F MED LIST DOCD IN RCRD: CPT | Mod: CPTII,S$GLB,, | Performed by: PHYSICIAN ASSISTANT

## 2022-04-06 PROCEDURE — 3074F SYST BP LT 130 MM HG: CPT | Mod: CPTII,S$GLB,, | Performed by: PHYSICIAN ASSISTANT

## 2022-04-06 PROCEDURE — 3074F PR MOST RECENT SYSTOLIC BLOOD PRESSURE < 130 MM HG: ICD-10-PCS | Mod: CPTII,S$GLB,, | Performed by: PHYSICIAN ASSISTANT

## 2022-04-06 PROCEDURE — 99395 PREV VISIT EST AGE 18-39: CPT | Mod: S$GLB,,, | Performed by: PHYSICIAN ASSISTANT

## 2022-04-06 PROCEDURE — 3008F BODY MASS INDEX DOCD: CPT | Mod: CPTII,S$GLB,, | Performed by: PHYSICIAN ASSISTANT

## 2022-04-06 PROCEDURE — 3079F DIAST BP 80-89 MM HG: CPT | Mod: CPTII,S$GLB,, | Performed by: PHYSICIAN ASSISTANT
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
## 2022-04-11 LAB
ABS NRBC COUNT: 0 X 10 3/UL (ref 0–0.01)
ABSOLUTE BASOPHIL: 0.03 X 10 3/UL (ref 0–0.22)
ABSOLUTE EOSINOPHIL: 0.26 X 10 3/UL (ref 0.04–0.54)
ABSOLUTE IMMATURE GRAN: 0.02 X 10 3/UL (ref 0–0.04)
ABSOLUTE LYMPHOCYTE: 2.34 X 10 3/UL (ref 0.86–4.75)
ABSOLUTE MONOCYTE: 0.61 X 10 3/UL (ref 0.22–1.08)
ALBUMIN SERPL-MCNC: 4.1 G/DL (ref 3.5–5.2)
ALBUMIN/GLOB SERPL ELPH: 1.4 {RATIO} (ref 1–2.7)
ALP ISOS SERPL LEV INH-CCNC: 95 U/L (ref 35–105)
ALT (SGPT): 10 U/L (ref 0–33)
ANION GAP SERPL CALC-SCNC: 11 MMOL/L (ref 8–17)
AST SERPL-CCNC: 11 U/L (ref 0–32)
BASOPHILS NFR BLD: 0.3 % (ref 0.2–1.2)
BILIRUBIN, TOTAL: 0.26 MG/DL (ref 0–1.2)
BUN/CREAT SERPL: 14.7 (ref 6–20)
CALCIUM SERPL-MCNC: 9.1 MG/DL (ref 8.6–10.2)
CARBON DIOXIDE, CO2: 25 MMOL/L (ref 22–29)
CHLORIDE: 104 MMOL/L (ref 98–107)
CREAT SERPL-MCNC: 0.62 MG/DL (ref 0.5–0.9)
DATE/TIME: NORMAL
EOSINOPHIL NFR BLD: 2.7 % (ref 0.7–7)
ESTIMATED AVERAGE GLUCOSE: 111 MG/DL
GFR ESTIMATION: 107.73
GLOBULIN: 2.9 G/DL (ref 1.5–4.5)
GLUCOSE: 87 MG/DL (ref 74–106)
HBA1C MFR BLD: 5.5 % (ref 4–6)
HCT VFR BLD AUTO: 35.8 % (ref 37–47)
HCV IGG SERPL QL IA: NONREACTIVE
HGB BLD-MCNC: 11 G/DL (ref 12–16)
HIV 1+2 AB+HIV1 P24 AG SERPL QL IA: NONREACTIVE
IMMATURE GRANULOCYTES: 0.2 % (ref 0–0.5)
LAB PERSONNEL: NORMAL
LYMPHOCYTES NFR BLD: 24.6 % (ref 19.3–53.1)
MCH RBC QN AUTO: 25 PG (ref 27–32)
MCHC RBC AUTO-ENTMCNC: 30.7 G/DL (ref 32–36)
MCV RBC AUTO: 81.4 FL (ref 82–100)
MONOCYTES NFR BLD: 6.4 % (ref 4.7–12.5)
NEUTROPHILS # BLD AUTO: 6.25 X 10 3/UL (ref 2.15–7.56)
NEUTROPHILS NFR BLD: 65.8 % (ref 34–71.1)
NUCLEATED RED BLOOD CELLS: 0 /100 WBC (ref 0–0.2)
PERSON NOTIFIED/TITLE: NORMAL
PLATELET # BLD AUTO: 426 X 10 3/UL (ref 135–400)
POTASSIUM: 4.7 MMOL/L (ref 3.5–5.1)
PROT SNV-MCNC: 7 G/DL (ref 6.4–8.3)
RBC # BLD AUTO: 4.4 X 10 6/UL (ref 4.2–5.4)
RDW-SD: 49.7 FL (ref 37–54)
REASON FOR REJECTION: NORMAL
RESOLUTION: NORMAL
SODIUM: 140 MMOL/L (ref 136–145)
TEST UNABLE TO PERFORM: NORMAL
TSH W/REFLEX TO FT4: 2.25 UIU/ML (ref 0.27–4.2)
UREA NITROGEN (BUN): 9.1 MG/DL (ref 6–20)
WBC # BLD: 9.51 X 10 3/UL (ref 4.3–10.8)

## 2022-04-13 ENCOUNTER — TELEPHONE (OUTPATIENT)
Dept: FAMILY MEDICINE | Facility: CLINIC | Age: 37
End: 2022-04-13
Payer: MEDICAID

## 2022-04-13 NOTE — TELEPHONE ENCOUNTER
Attempted to contact patient regarding lab results. No answer. Left detailed message for patient to call back.

## 2022-04-13 NOTE — TELEPHONE ENCOUNTER
----- Message from Abby Cheung PA-C sent at 4/13/2022  2:48 PM CDT -----  CBC- pt has h/o iron def anemia,  is she taking her iron?      HIV and Hep C- negative  TSH- within normal limits   CMP- within normal limits   A1C- within normal limits

## 2022-07-15 ENCOUNTER — PATIENT OUTREACH (OUTPATIENT)
Dept: ADMINISTRATIVE | Facility: HOSPITAL | Age: 37
End: 2022-07-15
Payer: MEDICAID

## 2022-11-16 ENCOUNTER — TELEPHONE (OUTPATIENT)
Dept: FAMILY MEDICINE | Facility: CLINIC | Age: 37
End: 2022-11-16
Payer: MEDICAID

## 2022-11-16 NOTE — TELEPHONE ENCOUNTER
Attempted to contact patient. No Answer, Left Detailed message. Patient advised to seek care at local urgent care.    ERICA Garcia

## 2022-11-16 NOTE — TELEPHONE ENCOUNTER
----- Message from Lesli Lucero sent at 11/16/2022 12:37 PM CST -----  Regarding: same day appt  Contact: pt  Type:  Same Day Appointment Request    Caller is requesting a same day appointment.  Caller declined first available appointment listed below.    Name of Caller: Heather Mckeon   When is the first available appointment? 03/29/23  Symptoms: boil on stomach   Best Call Back Number: [phone]  Additional Information:

## 2023-10-09 ENCOUNTER — TELEPHONE (OUTPATIENT)
Dept: FAMILY MEDICINE | Facility: CLINIC | Age: 38
End: 2023-10-09

## 2024-07-02 ENCOUNTER — TELEPHONE (OUTPATIENT)
Dept: PRIMARY CARE CLINIC | Facility: CLINIC | Age: 39
End: 2024-07-02
Payer: MEDICAID

## 2024-07-02 NOTE — TELEPHONE ENCOUNTER
Patient called and requested to schedule new pt appt with you and states it has been a while since she has been evaluated/labs but has never been diagnosed with anything. Please advise if ok to schedule.    Interpolation Flap Text: A decision was made to reconstruct the defect utilizing an interpolation axial flap and a staged reconstruction.  A telfa template was made of the defect.  This telfa template was then used to outline the interpolation flap.  The donor area for the pedicle flap was then injected with anesthesia.  The flap was excised through the skin and subcutaneous tissue down to the layer of the underlying musculature.  The interpolation flap was carefully excised within this deep plane to maintain its blood supply.  The edges of the donor site were undermined.   The donor site was closed in a primary fashion.  The pedicle was then rotated into position and sutured.  Once the tube was sutured into place, adequate blood supply was confirmed with blanching and refill.  The pedicle was then wrapped with xeroform gauze and dressed appropriately with a telfa and gauze bandage to ensure continued blood supply and protect the attached pedicle.

## 2024-07-02 NOTE — TELEPHONE ENCOUNTER
----- Message from Nisreen Parson sent at 7/2/2024 12:14 PM CDT -----  Contact: pt  Pt calling want to switch from [place] [facility] to [facility] and she can be reached at [phone]     Thanks,

## 2024-07-03 NOTE — TELEPHONE ENCOUNTER
Patient is already established with Dr. Baer.  Advised that she continue care with her  Unfortunately my scheduled is completely booked for next few months.

## 2025-03-07 ENCOUNTER — TELEPHONE (OUTPATIENT)
Facility: CLINIC | Age: 40
End: 2025-03-07
Payer: MEDICAID